# Patient Record
Sex: FEMALE | Race: WHITE | NOT HISPANIC OR LATINO
[De-identification: names, ages, dates, MRNs, and addresses within clinical notes are randomized per-mention and may not be internally consistent; named-entity substitution may affect disease eponyms.]

---

## 2022-02-16 PROBLEM — Z00.00 ENCOUNTER FOR PREVENTIVE HEALTH EXAMINATION: Status: ACTIVE | Noted: 2022-02-16

## 2022-03-28 ENCOUNTER — NON-APPOINTMENT (OUTPATIENT)
Age: 27
End: 2022-03-28

## 2022-03-30 ENCOUNTER — APPOINTMENT (OUTPATIENT)
Dept: NEUROLOGY | Facility: CLINIC | Age: 27
End: 2022-03-30
Payer: COMMERCIAL

## 2022-03-30 VITALS
BODY MASS INDEX: 17.58 KG/M2 | DIASTOLIC BLOOD PRESSURE: 73 MMHG | HEIGHT: 67 IN | SYSTOLIC BLOOD PRESSURE: 117 MMHG | HEART RATE: 78 BPM | TEMPERATURE: 97.5 F | OXYGEN SATURATION: 100 % | WEIGHT: 112 LBS

## 2022-03-30 PROCEDURE — 99205 OFFICE O/P NEW HI 60 MIN: CPT

## 2022-03-30 NOTE — HISTORY OF PRESENT ILLNESS
[FreeTextEntry1] : Reason for consult: post covid symptoms\par \par HPI: SEAN WHARTON is a 26 year old woman \par \par Vaccinated for covid in late 4/2021. Dizziness and headaches since then. 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 Saw neurologist and got MRIs which per pt report was negative. Also had blood work and EEG that was reportedly negative. Then saw 2 other neurologists and has been seeing Dr. Seven Granados in [facility]. Got 4wks of prednisone taper. Myoclonus continued but less frequent. Then started topamax which she took for 3 weeks and started losing weight, so stopped topamax. Then had LP which showed "high levels of covid antibodies". Then tried ajovy, tried nurtec and ubrelvy and neither helped. Then tried IVIG 3wks ago complicated by worsened headaches, so was told to stop IVIG. Was suggested to try botox but pt also has dizziness. \par Saw Jose Gallegos as another opinion today before seeing me.\par \par ROS/Current Sx:\par rare myoclonus\par headaches - daily, associated with dizziness, 10-20% of the day, bifrontal. sometimes dizziness comes alone. Tylenol 500mg daily helps, exedrin makes her dizzy. Intolerant to topamax.\par \par PMHX:\par headaches\par \par MEDS:\par tylenol\par junel\par \par ALL: bivigam\par \par SHx: no tob, no etoh, no drugs. on leave - consultant\par \par FHx: sister with migraines her whole life, mother with hashimotos thyroiditis.\par \par Vitals:  unremarkable\par \par Exam:\par \par AO3.  Normally conversant.  Follows commands, names, and repeats.  Good attention.\par \par PERRL, VFF, EOMI, no nystagmus, face symmetric, TUP at midline.\par \par Motor: \par                                                 R:                               L:\par Del                                           5                                5\par Bi                                              5                               5\par Tri                                            5                               5\par Wrist Extensors                      5                               5\par Finger abductors                    5                               5\par                                         5                               5 \par \par HF                                           5                               5\par KE                                           5                               5\par KF                                           5                               5\par DF                                           5                               5\par PF                                           5                               5\par \par Tone                                       R                               L\par UE                                          0                                0 \par LE                                          0                                0\par \par Sensory                                RUE                      LUE                 RLE                LLE     \par LT                                           +                            +                      +                   +\par Vib                                          +                            +                      +                   +\par JPS                                         +                            +                      +                   +\par PP                                         +                            +                      +                   +\par Temp                                     +                            +                      +                   +\par \par Reflexes:\par                                              R                             L                            \par Biceps                                  2                             2\par BR                                        2                             2\par Triceps                                2                              2\par Pat                                        2                            2 \par AJ                                        2                             2\par \par TOES                                    F                            F\par \par Coordination:\par                                              R                             L                       \par FTN                                       0                             0 \par PAYAL                                      0                            0\par HTS                                      0                             0 \par \par Other                                                                          \par  \par Gait: normal, can heel, toe, tandem\par \par                     Assistance: none\par \par \par negative rheum panel for vasculitis - seen on pt's phone, from 9/2021.\par \par \par CSF 9/2021\par negative OCBs\par WNV neg\par WBC 0\par EBV PCR neg\par lyme pcr neg\par crypto neg\par protein wnl\par +sars cov-2 spike ab in CSF\par \par \par EEG 5/2021 - unremarkable\par \par \par MRI C spine 5/2021(R) - read as minimal c5-6 disc bulge and otherwise unremarkable\par \par MRA head 5/2021 ([facility]) - read as normal\par \par MRV head 5/2021 ([facility]) - read as normal.\par \par MRI brain 5/2021 ([facility]) - read as normal\par \par \par AP: 25yo w/ headaches, dizziness, and 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 Work up with MRI, EEG, and LP has been unrevealing. While reported myoclonus has subsided alongside empiric steroid use, headaches and dizziness have been intractable. Had also been started on IVIG but developed intolerance. \par \par Unclear etiology of headaches/dizziness and whether these are definitively related to vaccination cannot be discerned. However, no objective test has demonstrated evidence of a CNS inflammatory process. I have recommended that she continue treatment with her headache specialist to target the headaches and I suggested botox and/or nortryptiline as reasonable next steps. She can RTC with me prn.\par \par all questions answered, education provided, management discussed at length.\par \par \par

## 2024-08-05 ENCOUNTER — APPOINTMENT (OUTPATIENT)
Dept: OTOLARYNGOLOGY | Facility: CLINIC | Age: 29
End: 2024-08-05

## 2024-08-05 PROBLEM — H92.09 EAR DISCOMFORT: Status: ACTIVE | Noted: 2024-08-05

## 2024-08-05 PROCEDURE — 99202 OFFICE O/P NEW SF 15 MIN: CPT

## 2024-08-05 NOTE — ASSESSMENT
[FreeTextEntry1] : No evidence of infection exostosis or ear abnormality. 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 Follow-up on an as-needed basis.

## 2024-08-05 NOTE — HISTORY OF PRESENT ILLNESS
[de-identified] : Initial visit.  Comes in complaining of feeling of water in her ears.  She is a surfer.  She reports that at times when she goes in the water she even feels some discomfort. Of note she does have a history of bruxism.

## 2025-01-06 ENCOUNTER — OUTPATIENT (OUTPATIENT)
Dept: OUTPATIENT SERVICES | Facility: HOSPITAL | Age: 30
LOS: 1 days | End: 2025-01-06

## 2025-01-06 ENCOUNTER — RESULT REVIEW (OUTPATIENT)
Age: 30
End: 2025-01-06

## 2025-01-06 ENCOUNTER — APPOINTMENT (OUTPATIENT)
Dept: RADIOLOGY | Facility: CLINIC | Age: 30
End: 2025-01-06
Payer: COMMERCIAL

## 2025-01-06 PROCEDURE — 72220 X-RAY EXAM SACRUM TAILBONE: CPT | Mod: 26

## 2025-01-07 ENCOUNTER — TRANSCRIPTION ENCOUNTER (OUTPATIENT)
Age: 30
End: 2025-01-07